# Patient Record
Sex: MALE | Race: WHITE | NOT HISPANIC OR LATINO | Employment: OTHER | URBAN - METROPOLITAN AREA
[De-identification: names, ages, dates, MRNs, and addresses within clinical notes are randomized per-mention and may not be internally consistent; named-entity substitution may affect disease eponyms.]

---

## 2018-10-12 NOTE — PATIENT DISCUSSION
GLAUCOMA SUSPECT, OU : INTRAOCULAR PRESSURE AND OCT IS WITHIN ACCEPTABLE LIMITS. NO DROP THERAPY NEEDED AT THIS TIME.

## 2019-04-11 NOTE — PATIENT DISCUSSION
POSTERIOR CAPSULAR FIBROSIS, OD:  VISUALLY SIGNIFICANT. OPTION OF YAG LASER VERSUS UPDATING GLASSES VERSUS FOLLOWING DISCUSSED. RBA'S DISCUSSED, PATIENT UNDERSTANDS AND DESIRES YAG LASER TO INCREASE VISION FOR DRIVING AT NIGHT DUE TO GLARE AND SEEING THE HYMN BOOK. SCHEDULE YAG LASER OD.

## 2021-07-19 ENCOUNTER — CATARACT CONSULT (OUTPATIENT)
Dept: URBAN - METROPOLITAN AREA CLINIC 35 | Facility: CLINIC | Age: 56
End: 2021-07-19

## 2021-07-19 DIAGNOSIS — H25.811: ICD-10-CM

## 2021-07-19 DIAGNOSIS — H25.812: ICD-10-CM

## 2021-07-19 PROCEDURE — V2799PMN IMPRIMIS PRED-MOXI-NEPAF 5ML

## 2021-07-19 PROCEDURE — 92136TC50 INTERFEROMETRY - TECHNICAL COMPONENT

## 2021-07-19 PROCEDURE — 92004 COMPRE OPH EXAM NEW PT 1/>: CPT

## 2021-07-19 ASSESSMENT — VISUAL ACUITY
OS_SC: 20/400-
OS_CC: 20/60-2
OD_CC: 20/400-
OD_SC: CF 5FT

## 2021-07-19 ASSESSMENT — TONOMETRY
OD_IOP_MMHG: 15
OS_IOP_MMHG: 13

## 2021-07-27 ENCOUNTER — POST-OP (OUTPATIENT)
Dept: URBAN - METROPOLITAN AREA CLINIC 39 | Facility: CLINIC | Age: 56
End: 2021-07-27

## 2021-07-27 ENCOUNTER — H&P (OUTPATIENT)
Dept: URBAN - METROPOLITAN AREA SURGERY 14 | Facility: SURGERY | Age: 56
End: 2021-07-27

## 2021-07-27 ENCOUNTER — SURGERY/PROCEDURE (OUTPATIENT)
Dept: URBAN - METROPOLITAN AREA CLINIC 35 | Facility: CLINIC | Age: 56
End: 2021-07-27

## 2021-07-27 DIAGNOSIS — Z96.1: ICD-10-CM

## 2021-07-27 DIAGNOSIS — H25.811: ICD-10-CM

## 2021-07-27 DIAGNOSIS — H25.812: ICD-10-CM

## 2021-07-27 PROCEDURE — 99024 POSTOP FOLLOW-UP VISIT: CPT

## 2021-07-27 PROCEDURE — 6698454 REMOVE CATARACT;INSERT LENS (SX ONLY)

## 2021-07-27 PROCEDURE — 99211T TECH SERVICE

## 2021-07-27 ASSESSMENT — TONOMETRY: OD_IOP_MMHG: 10

## 2021-07-27 ASSESSMENT — VISUAL ACUITY: OD_SC: 20/80-1

## 2021-08-05 ENCOUNTER — SURGERY/PROCEDURE (OUTPATIENT)
Dept: URBAN - METROPOLITAN AREA CLINIC 35 | Facility: CLINIC | Age: 56
End: 2021-08-05

## 2021-08-05 ENCOUNTER — POST OP/EVAL OF SECOND EYE (OUTPATIENT)
Dept: URBAN - METROPOLITAN AREA CLINIC 39 | Facility: CLINIC | Age: 56
End: 2021-08-05

## 2021-08-05 DIAGNOSIS — H25.812: ICD-10-CM

## 2021-08-05 DIAGNOSIS — Z96.1: ICD-10-CM

## 2021-08-05 PROCEDURE — 99212 OFFICE O/P EST SF 10 MIN: CPT

## 2021-08-05 PROCEDURE — 6698454 REMOVE CATARACT;INSERT LENS (SX ONLY)

## 2021-08-05 ASSESSMENT — TONOMETRY
OS_IOP_MMHG: 15
OD_IOP_MMHG: 12

## 2021-08-05 ASSESSMENT — VISUAL ACUITY
OD_SC: J1
OS_SC: J1
OS_SC: 20/200
OD_SC: 20/200

## 2021-08-06 ENCOUNTER — POST OP/EVAL OF SECOND EYE (OUTPATIENT)
Dept: URBAN - METROPOLITAN AREA CLINIC 35 | Facility: CLINIC | Age: 56
End: 2021-08-06

## 2021-08-06 DIAGNOSIS — Z96.1: ICD-10-CM

## 2021-08-06 ASSESSMENT — TONOMETRY: OS_IOP_MMHG: 17

## 2021-08-06 ASSESSMENT — VISUAL ACUITY
OS_SC: 20/100
OS_SC: J1

## 2022-05-11 NOTE — PATIENT DISCUSSION
Intraocular pressure within acceptable limits. Optic disc photo taken today, stable. No drop therapy at this time. Return as scheduled.

## 2022-05-11 NOTE — PATIENT DISCUSSION
Reviewed importance of regular dilated eye exams to minimize risk of vision loss. OCT preformed today to document.

## 2025-03-12 ENCOUNTER — SURGERY/PROCEDURE (OUTPATIENT)
Age: 60
End: 2025-03-12

## 2025-03-12 ENCOUNTER — CONSULTATION/EVALUATION (OUTPATIENT)
Age: 60
End: 2025-03-12

## 2025-03-12 DIAGNOSIS — H26.493: ICD-10-CM

## 2025-03-12 PROCEDURE — 99213 OFFICE O/P EST LOW 20 MIN: CPT | Mod: 57
